# Patient Record
Sex: MALE | Race: BLACK OR AFRICAN AMERICAN
[De-identification: names, ages, dates, MRNs, and addresses within clinical notes are randomized per-mention and may not be internally consistent; named-entity substitution may affect disease eponyms.]

---

## 2018-06-11 ENCOUNTER — HOSPITAL ENCOUNTER (EMERGENCY)
Dept: HOSPITAL 72 - EMR | Age: 18
Discharge: HOME | End: 2018-06-11
Payer: COMMERCIAL

## 2018-06-11 VITALS — HEIGHT: 72 IN | WEIGHT: 130 LBS | BODY MASS INDEX: 17.61 KG/M2

## 2018-06-11 VITALS — SYSTOLIC BLOOD PRESSURE: 109 MMHG | DIASTOLIC BLOOD PRESSURE: 73 MMHG

## 2018-06-11 VITALS — SYSTOLIC BLOOD PRESSURE: 133 MMHG | DIASTOLIC BLOOD PRESSURE: 97 MMHG

## 2018-06-11 DIAGNOSIS — S62.396A: Primary | ICD-10-CM
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
## 2018-06-11 PROCEDURE — 99283 EMERGENCY DEPT VISIT LOW MDM: CPT

## 2018-06-11 PROCEDURE — 96372 THER/PROPH/DIAG INJ SC/IM: CPT

## 2018-06-11 PROCEDURE — 73130 X-RAY EXAM OF HAND: CPT
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
## 2018-06-20 ENCOUNTER — HOSPITAL ENCOUNTER (EMERGENCY)
Dept: HOSPITAL 72 - EMR | Age: 18
Discharge: HOME | End: 2018-06-20
Payer: COMMERCIAL

## 2018-06-20 VITALS — HEIGHT: 72 IN | WEIGHT: 130 LBS | BODY MASS INDEX: 17.61 KG/M2

## 2018-06-20 VITALS — DIASTOLIC BLOOD PRESSURE: 71 MMHG | SYSTOLIC BLOOD PRESSURE: 126 MMHG

## 2018-06-20 DIAGNOSIS — S62.336D: Primary | ICD-10-CM
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
## 2018-06-20 PROCEDURE — 99283 EMERGENCY DEPT VISIT LOW MDM: CPT

## 2018-06-20 PROCEDURE — 29105 APPLICATION LONG ARM SPLINT: CPT

## 2018-06-20 NOTE — EMERGENCY ROOM REPORT
History of Present Illness


General


Chief Complaint:  Upper Extremity Injury


Source:  Patient





Present Illness


HPI


17-year-old male presents ED for splint placement.  Patient states he was 

called back today As he had a broken bone in his right hand.  Was seen here on 6 /11.  Had x-rays and was subsequently discharged.  Patient states pain is 

throbbing, 8 out of 10, nonradiating.  Denies any other injuries.  No other 

aggravating relieving factors.  Denies any other associated symptoms


Allergies:  


Coded Allergies:  


     No Known Allergies (Unverified , 6/11/18)





Patient History


Past Medical History:  none


Past Surgical History:  none


Pertinent Family History:  none


Social History:  Denies: smoking, alcohol use, drug use


Immunizations:  UTD


Reviewed Nursing Documentation:  PMH: Agreed; PSxH: Agreed





Nursing Documentation-PMH


Past Medical History:  No Stated History





Review of Systems


All Other Systems:  negative except mentioned in HPI





Physical Exam





Vital Signs








  Date Time  Temp Pulse Resp B/P (MAP) Pulse Ox O2 Delivery O2 Flow Rate FiO2


 


6/20/18 19:32 98.9 82 18 126/71 99 Room Air  





 99.0       








Sp02 EP Interpretation:  reviewed, normal


General Appearance:  no apparent distress, alert, GCS 15, non-toxic


Head:  normocephalic


Eyes:  bilateral eye normal inspection, bilateral eye PERRL


ENT:  normal ENT inspection


Neck:  normal inspection


Respiratory:  normal inspection


Cardiovascular #1:  normal inspection


Gastrointestinal:  normal inspection


Rectal:  deferred


Genitourinary:  no CVA tenderness


Musculoskeletal:  tender - R 5th digit swelling


Neurologic:  alert, oriented x3, responsive, motor strength/tone normal, 

sensory intact, speech normal


Psychiatric:  normal inspection


Skin:  normal inspection


Lymphatic:  normal inspection





Procedures


Splinting


Splinting :  


   Consent:  Verbal


   Hand-Made Type:  plaster


   Splint:  ulnar


   Pre-Proc Neuro Vasc Exam:  normal


   Post-Proc Neuro Vasc Exam:  normal


   Patient Tolerated:  Well


   Complications:  None





Medical Decision Making


Diagnostic Impression:  


 Primary Impression:  


 Boxers fracture


 Qualified Codes:  S62.339D - Displaced fracture of neck of unspecified 

metacarpal bone, subsequent encounter for fracture with routine healing


ER Course


16 yo M presents to ED for splint placement





After initial history and physical I reviewed EMR.  On 6/11 patient had x-rays 

which documented an old fracture to the fifth metacarpal.  Radiology reviewed 

the x-rays and noted the fracture to be acute.  Patient was called back.





Ulnar gutter splint placed on the patient.  Patient safe for discharge 

recommend close follow-up with PMD and/or to follow-up as outpatient





Diagnosisboxer fracture





Stable and discharged to home.  Follow-up with PMD/orthopedic.  Return to ED if 

symptoms recur or worsen





Last Vital Signs








  Date Time  Temp Pulse Resp B/P (MAP) Pulse Ox O2 Delivery O2 Flow Rate FiO2


 


6/20/18 20:16 98.9  18 126/71 99 Room Air  





 99.0       


 


6/20/18 19:32  82      








Status:  improved


Disposition:  HOME, SELF-CARE


Condition:  Stable


Referrals:  


[facility],REFERRING (PCP)


Patient Instructions:  Boxer's Fracture-[facility]











Geovanni Baez MD Jun 20, 2018 22:13